# Patient Record
Sex: MALE | Race: ASIAN
[De-identification: names, ages, dates, MRNs, and addresses within clinical notes are randomized per-mention and may not be internally consistent; named-entity substitution may affect disease eponyms.]

---

## 2020-05-01 ENCOUNTER — HOSPITAL ENCOUNTER (EMERGENCY)
Dept: HOSPITAL 54 - ER | Age: 30
Discharge: HOME | End: 2020-05-01
Payer: MEDICAID

## 2020-05-01 VITALS — BODY MASS INDEX: 22.76 KG/M2 | WEIGHT: 145 LBS | HEIGHT: 67 IN

## 2020-05-01 VITALS — SYSTOLIC BLOOD PRESSURE: 139 MMHG | DIASTOLIC BLOOD PRESSURE: 89 MMHG

## 2020-05-01 DIAGNOSIS — F23: Primary | ICD-10-CM

## 2020-05-01 DIAGNOSIS — F31.9: ICD-10-CM

## 2020-05-01 LAB
ALBUMIN SERPL BCP-MCNC: 5 G/DL (ref 3.4–5)
ALP SERPL-CCNC: 99 U/L (ref 46–116)
ALT SERPL W P-5'-P-CCNC: 23 U/L (ref 12–78)
APAP SERPL-MCNC: < 2 UG/ML (ref 10–30)
APPEARANCE UR: (no result)
AST SERPL W P-5'-P-CCNC: 25 U/L (ref 15–37)
BASOPHILS # BLD AUTO: 0.1 /CMM (ref 0–0.2)
BASOPHILS NFR BLD AUTO: 0.4 % (ref 0–2)
BILIRUB DIRECT SERPL-MCNC: 0.2 MG/DL (ref 0–0.2)
BILIRUB SERPL-MCNC: 1.1 MG/DL (ref 0.2–1)
BILIRUB UR QL STRIP: (no result)
BUN SERPL-MCNC: 33 MG/DL (ref 7–18)
CALCIUM SERPL-MCNC: 10 MG/DL (ref 8.5–10.1)
CHLORIDE SERPL-SCNC: 100 MMOL/L (ref 98–107)
CO2 SERPL-SCNC: 22 MMOL/L (ref 21–32)
COLOR UR: (no result)
CREAT SERPL-MCNC: 1.5 MG/DL (ref 0.6–1.3)
EOSINOPHIL NFR BLD AUTO: 0.2 % (ref 0–6)
ETHANOL SERPL-MCNC: < 3 MG/DL (ref 0–0)
GLUCOSE SERPL-MCNC: 126 MG/DL (ref 74–106)
HCT VFR BLD AUTO: 44 % (ref 39–51)
HGB BLD-MCNC: 14.7 G/DL (ref 13.5–17.5)
KETONES UR STRIP-MCNC: 15 MG/DL
LYMPHOCYTES NFR BLD AUTO: 1.4 /CMM (ref 0.8–4.8)
LYMPHOCYTES NFR BLD AUTO: 10.1 % (ref 20–44)
MCHC RBC AUTO-ENTMCNC: 34 G/DL (ref 31–36)
MCV RBC AUTO: 91 FL (ref 80–96)
MONOCYTES NFR BLD AUTO: 1.3 /CMM (ref 0.1–1.3)
MONOCYTES NFR BLD AUTO: 9.6 % (ref 2–12)
NEUTROPHILS # BLD AUTO: 11.1 /CMM (ref 1.8–8.9)
NEUTROPHILS NFR BLD AUTO: 79.7 % (ref 43–81)
PH UR STRIP: 5.5 [PH] (ref 5–8)
PLATELET # BLD AUTO: 263 /CMM (ref 150–450)
POTASSIUM SERPL-SCNC: 4.1 MMOL/L (ref 3.5–5.1)
PROT SERPL-MCNC: 9.6 G/DL (ref 6.4–8.2)
PROT UR QL STRIP: >=300 MG/DL
RBC # BLD AUTO: 4.79 MIL/UL (ref 4.5–6)
SALICYLATES SERPL-MCNC: 3.9 MG/DL (ref 2.8–20)
SODIUM SERPL-SCNC: 137 MMOL/L (ref 136–145)
UROBILINOGEN UR STRIP-MCNC: 0.2 EU/DL
WBC #/AREA URNS HPF: (no result) /HPF (ref 0–3)
WBC NRBC COR # BLD AUTO: 13.9 K/UL (ref 4.3–11)

## 2020-05-01 PROCEDURE — 80048 BASIC METABOLIC PNL TOTAL CA: CPT

## 2020-05-01 PROCEDURE — 96372 THER/PROPH/DIAG INJ SC/IM: CPT

## 2020-05-01 PROCEDURE — G0480 DRUG TEST DEF 1-7 CLASSES: HCPCS

## 2020-05-01 PROCEDURE — 80076 HEPATIC FUNCTION PANEL: CPT

## 2020-05-01 PROCEDURE — 80329 ANALGESICS NON-OPIOID 1 OR 2: CPT

## 2020-05-01 PROCEDURE — 80305 DRUG TEST PRSMV DIR OPT OBS: CPT

## 2020-05-01 PROCEDURE — 85025 COMPLETE CBC W/AUTO DIFF WBC: CPT

## 2020-05-01 PROCEDURE — 81001 URINALYSIS AUTO W/SCOPE: CPT

## 2020-05-01 PROCEDURE — 99285 EMERGENCY DEPT VISIT HI MDM: CPT

## 2020-05-01 PROCEDURE — 36415 COLL VENOUS BLD VENIPUNCTURE: CPT

## 2020-05-01 PROCEDURE — 80307 DRUG TEST PRSMV CHEM ANLYZR: CPT

## 2020-05-01 NOTE — NUR
LCSW received a call from [facility] informing LCSW to evaluate the pt. Per MD notes and 
chart review, pt is a 29-year-old man who was walking around and behaving bizarrely multiple 
calls to the police department were made on arrival the Police Department found him to be 
confrontational and yelling at them and behaving bizarrely EMS was asked to present and the 
patient was brought to the hospital there is been no criminal charges however he is behaving 
bizarrely and has also in my asking him a history begun shouting at me and repeating I do 
not like you I do like you I do not like you your psychiatrist. LCSW met with the pt 
bedside. Pt is alert and oriented x 1. Pt is unable to participate in any meaningful 
conversation at this time. Pt appears disheveled. Pt is hallucination stating, " the girl is 
coming to kill me". Pt needs to be medicated at this time. LCSW consulted with MD who will 
medicate the pt.

## 2020-05-01 NOTE — NUR
PT AWAKE IN BED. VERBALIZED THAT HE FEELS BETTER. PT IS CALM AND COOPERATIVE. 
RELEASED L ARM RESTRAINT. +CMS. PT PROVIDED WITH MEAL.

## 2020-05-01 NOTE — NUR
PT REQUESTING TO BE DISCHARGED HOME. PT ALERT AND ORIENTED. PT DENIES SI/HI. PT 
STATES THAT HE WILL TAKE A LYFT HOME. DR CATES AWARE.

## 2020-05-01 NOTE — NUR
PT [id number]/THOR FROM STREETS ACTING BIZZARE, PT IS AAOX2, NOT IN RESPIRATORY 
DISTRESS, HOOKED TO MONITOR, KEPT RESTED AND COMFORTABLE, WILL CONTINUE TO 
MONITOR.

## 2020-05-01 NOTE — NUR
PT IS CLEARED FOR DISCHARGE BY DR. CATES. PT IS AAOX4. VERBALIZES THAT HE FEELS 
BETTER. AMBULATORY ON STEADY GAIT W/O ASSIST

## 2020-05-01 NOTE — NUR
PT WAS NOTED AGITATED AGAIN. SCEAMING YELLING AND PARANOID THAT SOMEONE WILL 
HURT HIM. MD MADE AWARE. ORDER RECEIVED TO GIVE ANOTHER DOSE OF ZYPREXA 10MG 
IM. NOTED AND CARRIED OUT.

## 2023-08-08 ENCOUNTER — HOSPITAL ENCOUNTER (INPATIENT)
Facility: HOSPITAL | Age: 33
LOS: 1 days | Discharge: PSYCHIATRIC OR SUBSTANCE USE FACILITY | DRG: 885 | End: 2023-08-09
Attending: PSYCHIATRY & NEUROLOGY | Admitting: PSYCHIATRY & NEUROLOGY
Payer: COMMERCIAL

## 2023-08-08 ENCOUNTER — HOSPITAL ENCOUNTER (EMERGENCY)
Facility: HOSPITAL | Age: 33
Discharge: PSYCHIATRIC OR SUBSTANCE USE FACILITY | End: 2023-08-08
Attending: EMERGENCY MEDICINE
Payer: COMMERCIAL

## 2023-08-08 VITALS
RESPIRATION RATE: 18 BRPM | HEIGHT: 66 IN | TEMPERATURE: 99 F | BODY MASS INDEX: 28.12 KG/M2 | WEIGHT: 175 LBS | HEART RATE: 77 BPM | DIASTOLIC BLOOD PRESSURE: 65 MMHG | OXYGEN SATURATION: 96 % | SYSTOLIC BLOOD PRESSURE: 136 MMHG

## 2023-08-08 DIAGNOSIS — F29 PSYCHOSIS: ICD-10-CM

## 2023-08-08 DIAGNOSIS — Z00.8 MEDICAL CLEARANCE FOR PSYCHIATRIC ADMISSION: Primary | ICD-10-CM

## 2023-08-08 DIAGNOSIS — F23 ACUTE PSYCHOSIS: ICD-10-CM

## 2023-08-08 LAB
ALBUMIN SERPL BCP-MCNC: 4.3 G/DL (ref 3.5–5.2)
ALP SERPL-CCNC: 55 U/L (ref 55–135)
ALT SERPL W/O P-5'-P-CCNC: 51 U/L (ref 10–44)
AMPHET+METHAMPHET UR QL: NEGATIVE
ANION GAP SERPL CALC-SCNC: 11 MMOL/L (ref 8–16)
AST SERPL-CCNC: 56 U/L (ref 10–40)
BACTERIA #/AREA URNS HPF: ABNORMAL /HPF
BARBITURATES UR QL SCN>200 NG/ML: NEGATIVE
BASOPHILS # BLD AUTO: 0.04 K/UL (ref 0–0.2)
BASOPHILS NFR BLD: 0.6 % (ref 0–1.9)
BENZODIAZ UR QL SCN>200 NG/ML: NEGATIVE
BILIRUB SERPL-MCNC: 1.3 MG/DL (ref 0.1–1)
BILIRUB UR QL STRIP: NEGATIVE
BUN SERPL-MCNC: 13 MG/DL (ref 6–20)
BZE UR QL SCN: NEGATIVE
CALCIUM SERPL-MCNC: 9.3 MG/DL (ref 8.7–10.5)
CANNABINOIDS UR QL SCN: NEGATIVE
CHLORIDE SERPL-SCNC: 102 MMOL/L (ref 95–110)
CLARITY UR: CLEAR
CO2 SERPL-SCNC: 24 MMOL/L (ref 23–29)
COLOR UR: YELLOW
CREAT SERPL-MCNC: 0.8 MG/DL (ref 0.5–1.4)
CREAT UR-MCNC: 191.9 MG/DL (ref 23–375)
DIFFERENTIAL METHOD: ABNORMAL
EOSINOPHIL # BLD AUTO: 0.1 K/UL (ref 0–0.5)
EOSINOPHIL NFR BLD: 1 % (ref 0–8)
ERYTHROCYTE [DISTWIDTH] IN BLOOD BY AUTOMATED COUNT: 11.3 % (ref 11.5–14.5)
EST. GFR  (NO RACE VARIABLE): >60 ML/MIN/1.73 M^2
ETHANOL SERPL-MCNC: <10 MG/DL
GLUCOSE SERPL-MCNC: 112 MG/DL (ref 70–110)
GLUCOSE UR QL STRIP: ABNORMAL
GRAN CASTS #/AREA URNS LPF: 1 /LPF
HCT VFR BLD AUTO: 37.4 % (ref 40–54)
HGB BLD-MCNC: 12.9 G/DL (ref 14–18)
HGB UR QL STRIP: ABNORMAL
HYALINE CASTS #/AREA URNS LPF: 131 /LPF
IMM GRANULOCYTES # BLD AUTO: 0.02 K/UL (ref 0–0.04)
IMM GRANULOCYTES NFR BLD AUTO: 0.3 % (ref 0–0.5)
KETONES UR QL STRIP: NEGATIVE
LEUKOCYTE ESTERASE UR QL STRIP: NEGATIVE
LYMPHOCYTES # BLD AUTO: 1.6 K/UL (ref 1–4.8)
LYMPHOCYTES NFR BLD: 22.4 % (ref 18–48)
MCH RBC QN AUTO: 30.8 PG (ref 27–31)
MCHC RBC AUTO-ENTMCNC: 34.5 G/DL (ref 32–36)
MCV RBC AUTO: 89 FL (ref 82–98)
METHADONE UR QL SCN>300 NG/ML: NEGATIVE
MICROSCOPIC COMMENT: ABNORMAL
MONOCYTES # BLD AUTO: 0.7 K/UL (ref 0.3–1)
MONOCYTES NFR BLD: 9.4 % (ref 4–15)
NEUTROPHILS # BLD AUTO: 4.7 K/UL (ref 1.8–7.7)
NEUTROPHILS NFR BLD: 66.3 % (ref 38–73)
NITRITE UR QL STRIP: NEGATIVE
NRBC BLD-RTO: 0 /100 WBC
OPIATES UR QL SCN: NEGATIVE
PCP UR QL SCN>25 NG/ML: NEGATIVE
PH UR STRIP: 6 [PH] (ref 5–8)
PLATELET # BLD AUTO: 209 K/UL (ref 150–450)
PMV BLD AUTO: 9.9 FL (ref 9.2–12.9)
POTASSIUM SERPL-SCNC: 3.1 MMOL/L (ref 3.5–5.1)
PROT SERPL-MCNC: 7.5 G/DL (ref 6–8.4)
PROT UR QL STRIP: ABNORMAL
RBC # BLD AUTO: 4.19 M/UL (ref 4.6–6.2)
RBC #/AREA URNS HPF: 11 /HPF (ref 0–4)
SARS-COV-2 RDRP RESP QL NAA+PROBE: NEGATIVE
SODIUM SERPL-SCNC: 137 MMOL/L (ref 136–145)
SP GR UR STRIP: 1.02 (ref 1–1.03)
TOXICOLOGY INFORMATION: NORMAL
TSH SERPL DL<=0.005 MIU/L-ACNC: 1.81 UIU/ML (ref 0.4–4)
UNIDENT CRYS URNS QL MICRO: ABNORMAL
URN SPEC COLLECT METH UR: ABNORMAL
UROBILINOGEN UR STRIP-ACNC: NEGATIVE EU/DL
WBC # BLD AUTO: 7.02 K/UL (ref 3.9–12.7)
WBC #/AREA URNS HPF: 3 /HPF (ref 0–5)
WBC CLUMPS URNS QL MICRO: ABNORMAL

## 2023-08-08 PROCEDURE — 11400000 HC PSYCH PRIVATE ROOM

## 2023-08-08 PROCEDURE — 99285 EMERGENCY DEPT VISIT HI MDM: CPT

## 2023-08-08 PROCEDURE — 85025 COMPLETE CBC W/AUTO DIFF WBC: CPT | Performed by: EMERGENCY MEDICINE

## 2023-08-08 PROCEDURE — 82077 ASSAY SPEC XCP UR&BREATH IA: CPT | Performed by: EMERGENCY MEDICINE

## 2023-08-08 PROCEDURE — G0427 PR INPT TELEHEALTH CON 70/>M: ICD-10-PCS | Mod: GT,,, | Performed by: STUDENT IN AN ORGANIZED HEALTH CARE EDUCATION/TRAINING PROGRAM

## 2023-08-08 PROCEDURE — 25000003 PHARM REV CODE 250: Performed by: EMERGENCY MEDICINE

## 2023-08-08 PROCEDURE — G0427 INPT/ED TELECONSULT70: HCPCS | Mod: GT,,, | Performed by: STUDENT IN AN ORGANIZED HEALTH CARE EDUCATION/TRAINING PROGRAM

## 2023-08-08 PROCEDURE — 80053 COMPREHEN METABOLIC PANEL: CPT | Performed by: EMERGENCY MEDICINE

## 2023-08-08 PROCEDURE — U0002 COVID-19 LAB TEST NON-CDC: HCPCS | Performed by: EMERGENCY MEDICINE

## 2023-08-08 PROCEDURE — 84443 ASSAY THYROID STIM HORMONE: CPT | Performed by: EMERGENCY MEDICINE

## 2023-08-08 PROCEDURE — 81000 URINALYSIS NONAUTO W/SCOPE: CPT | Performed by: EMERGENCY MEDICINE

## 2023-08-08 PROCEDURE — S4991 NICOTINE PATCH NONLEGEND: HCPCS | Performed by: EMERGENCY MEDICINE

## 2023-08-08 PROCEDURE — 12400001 HC PSYCH SEMI-PRIVATE ROOM

## 2023-08-08 PROCEDURE — 25000003 PHARM REV CODE 250

## 2023-08-08 PROCEDURE — 80307 DRUG TEST PRSMV CHEM ANLYZR: CPT | Performed by: EMERGENCY MEDICINE

## 2023-08-08 RX ORDER — IBUPROFEN 200 MG
1 TABLET ORAL DAILY
Status: DISCONTINUED | OUTPATIENT
Start: 2023-08-09 | End: 2023-08-09 | Stop reason: HOSPADM

## 2023-08-08 RX ORDER — DIPHENHYDRAMINE HCL 50 MG
50 CAPSULE ORAL EVERY 6 HOURS PRN
Status: DISCONTINUED | OUTPATIENT
Start: 2023-08-08 | End: 2023-08-09 | Stop reason: HOSPADM

## 2023-08-08 RX ORDER — TRAZODONE HYDROCHLORIDE 100 MG/1
100 TABLET ORAL NIGHTLY PRN
Status: DISCONTINUED | OUTPATIENT
Start: 2023-08-08 | End: 2023-08-09 | Stop reason: HOSPADM

## 2023-08-08 RX ORDER — ONDANSETRON 4 MG/1
4 TABLET, ORALLY DISINTEGRATING ORAL EVERY 6 HOURS PRN
Status: DISCONTINUED | OUTPATIENT
Start: 2023-08-08 | End: 2023-08-09 | Stop reason: HOSPADM

## 2023-08-08 RX ORDER — DIPHENHYDRAMINE HYDROCHLORIDE 50 MG/ML
50 INJECTION INTRAMUSCULAR; INTRAVENOUS EVERY 6 HOURS PRN
Status: DISCONTINUED | OUTPATIENT
Start: 2023-08-08 | End: 2023-08-09 | Stop reason: HOSPADM

## 2023-08-08 RX ORDER — ACETAMINOPHEN 325 MG/1
650 TABLET ORAL EVERY 6 HOURS PRN
Status: DISCONTINUED | OUTPATIENT
Start: 2023-08-08 | End: 2023-08-09 | Stop reason: HOSPADM

## 2023-08-08 RX ORDER — HALOPERIDOL 5 MG/ML
10 INJECTION INTRAMUSCULAR EVERY 6 HOURS PRN
Status: DISCONTINUED | OUTPATIENT
Start: 2023-08-08 | End: 2023-08-09 | Stop reason: HOSPADM

## 2023-08-08 RX ORDER — HYDROXYZINE HYDROCHLORIDE 50 MG/1
50 TABLET, FILM COATED ORAL EVERY 4 HOURS PRN
Status: DISCONTINUED | OUTPATIENT
Start: 2023-08-08 | End: 2023-08-09 | Stop reason: HOSPADM

## 2023-08-08 RX ORDER — MAG HYDROX/ALUMINUM HYD/SIMETH 200-200-20
30 SUSPENSION, ORAL (FINAL DOSE FORM) ORAL EVERY 6 HOURS PRN
Status: DISCONTINUED | OUTPATIENT
Start: 2023-08-08 | End: 2023-08-09 | Stop reason: HOSPADM

## 2023-08-08 RX ORDER — LORAZEPAM 1 MG/1
2 TABLET ORAL EVERY 6 HOURS PRN
Status: DISCONTINUED | OUTPATIENT
Start: 2023-08-08 | End: 2023-08-09 | Stop reason: HOSPADM

## 2023-08-08 RX ORDER — LORAZEPAM 2 MG/ML
2 INJECTION INTRAMUSCULAR EVERY 6 HOURS PRN
Status: DISCONTINUED | OUTPATIENT
Start: 2023-08-08 | End: 2023-08-09 | Stop reason: HOSPADM

## 2023-08-08 RX ORDER — HALOPERIDOL 5 MG/1
10 TABLET ORAL EVERY 6 HOURS PRN
Status: DISCONTINUED | OUTPATIENT
Start: 2023-08-08 | End: 2023-08-09 | Stop reason: HOSPADM

## 2023-08-08 RX ORDER — ADHESIVE BANDAGE
30 BANDAGE TOPICAL DAILY PRN
Status: DISCONTINUED | OUTPATIENT
Start: 2023-08-08 | End: 2023-08-09 | Stop reason: HOSPADM

## 2023-08-08 RX ORDER — IBUPROFEN 200 MG
1 TABLET ORAL
Status: DISCONTINUED | OUTPATIENT
Start: 2023-08-08 | End: 2023-08-08 | Stop reason: HOSPADM

## 2023-08-08 RX ORDER — PROMETHAZINE HYDROCHLORIDE 25 MG/1
25 TABLET ORAL EVERY 6 HOURS PRN
Status: DISCONTINUED | OUTPATIENT
Start: 2023-08-08 | End: 2023-08-09 | Stop reason: HOSPADM

## 2023-08-08 RX ADMIN — NICOTINE 1 PATCH: 21 PATCH, EXTENDED RELEASE TRANSDERMAL at 03:08

## 2023-08-08 RX ADMIN — TRAZODONE HYDROCHLORIDE 100 MG: 100 TABLET ORAL at 08:08

## 2023-08-08 RX ADMIN — HYDROXYZINE HYDROCHLORIDE 50 MG: 50 TABLET, FILM COATED ORAL at 09:08

## 2023-08-08 NOTE — CONSULTS
"[facility]  Psychiatry  Telepsychiatry Consult Note    Please see previous notes:    Patient agreeable to consultation via telepsychiatry.    Tele-Consultation from Psychiatry started: 8/8/2023 at 9:21AM  The chief complaint leading to psychiatric consultation is: "OPC.  Patient with chronic auditory hallucinations.  Family OPC'd due to the patient wanting to leave the home.  He would locked himself in the bathroom to smoke a cigarette and the family believes he is unwell.  Pec?"  This consultation was requested by Dr. Arden Strickland, the Emergency Department attending physician.  The location of the consulting psychiatrist is [place], [place].  The patient location is  [facility] EMERGENCY DEPARTMENT   The patient arrived at the ED at: 06:23    Also present with the patient at the time of the consultation: nurse/sitter    Patient Identification:   Hardy Barnett is a 32 y.o. male.    Patient information was obtained from patient, ER records, and collateral .  Patient presented involuntarily to the Emergency Department BIB police.    Inpatient consult to Telemedicine - Psyc  Consult performed by: Remedios Fitch MD  Consult ordered by: Arden Strickland Jr., MD  Reason for consult: OPC'd by family members, chronic auditory hallucinations  Assessment/Recommendations: Diagnosis/Impression:   Bizarre behaviors  History of bipolar disorder, type 1  R/o Substance-induced bipolar disorder given patient and collateral history of cannabis use disorder although UDS negative this admission  R/o Tobacco use disorder  Nonadherence to medical treatment    Recommendations:  -PEC given grave disability secondary to underlying psychiatric disorder. Patient exhibits hypomanic behavior during interview and per collateral has not been adherent with his medications. Parents attempted to call patient's outpatient provider, Dr. Medrano but says they were not successful in reaching his office.  -Transfer to inpatient behavioral " "health hospital once medically cleared for further clarification of diagnosis and re-initiation/titration of medications.  -Recommend calling patient's outpatient psychiatrist, number in collateral section of HPI for medication reconciliation. Per chart review of patient's hospitalizations in [place], he was previously on Invega Sustenna BUNCH and olanzapine PO.  -For nonredirectable agitation/aggression, ok to give PRN olanzapine 5mg SL/IM q8hrs.  -Recommend obtaining EKG to monitor QT-c interval give pt's history of antipsychotic use  -Father and mother would appreciate updates on patient's status. There is some language and cultural barrier to discussing mental health issues. Please do be mindful of this when communicating with patient's parents.        Consult Start Time: 08/08/2023 09:21 CDT  Consult End Time: 08/08/2023 10:32 CDT        Subjective:     History of Present Illness:  Hardy Barnett is a 33y/o man with a history of schizoaffective disorder vs bipolar disorder with multiple ED visits for psychiatric complaints per chart review.    Per ED MD note:  "8/8/2023, 6:29 AM   History obtained from the patient and OPC  History of Present Illness: Hardy Barnett is a 32 y.o. male patient who presents to the Emergency Department for psychiatric evaluation. Per OPC, "[facility] states patient is diagnosed with bipolar. Unsure of prescription medication names. Patient ran away from home 2 days ago. Patient has since returned home and went to his room and locked the door. Patient is talking to himself with a 'voice.' Patient has been to a psychiatric facility in [place]. Patient filed a report to the police that he's trapped by his parents. Patient is a client of the group with Dr. Morales." Pt states that his parents filed the OPC after catching him smoking in the bathroom. Symptoms are constant and moderate in severity. No mitigating or exacerbating factors reported. Pt reports chronic auditory " "hallucinations. Patient denies any SI, HI, fever, chills, n/v/d, SOB, CP, weakness, numbness, dizziness, headache, and all other sxs at this time. No further complaints or concerns at this time.  Arrival mode: Police/long term Transport"    On interview:  Patient is lying in bed and states that he is in the hospital "because somehow my mom caught me smoking cigarettes in the house and brought me to the hospital." When asked why he would be brought in for smoking cigarettes, he says "she doesn't like cigarettes and doesn't want me to be happy."    Hardy denies any medical or psychiatric diagnoses and says that he is not taking any medications. He denies any past psychiatric hospitalizations. He says that he is adopted and doesn't know his family history.    When confronted about the report this provider read in the charts - that he ran away from home, he finally says: "My parents hold me hostage and never let me leave the house and I'm a grown man." He says that he took an Uber to the [employer] and starts to dance in the hospital bed and says, "I was gambling it up." He says that he "did pretty well" but that he lost 30% of his savings. He then says that he is a "" after his [facility] at the [facility]. 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 but couldn't and moved back to [place].    He says that the longest period of time he's gone without sleeping was "72hrs." Asked if he was using substances during this time he says that this was recently when he was at the [employer] and he was "smoking cigarettes, chewing Nicorette gum, drinking cranberry juice, eating sandwiches, drinking smoothies."    Collateral: Mother and Father, Yony Barnett [phone]  Initially called patient's mother who directed this provider to call his father who is at the hospital. Interview is somewhat complicated due to language and cultural barrier.    Father says " "that:  "He's been under care before and diagnosed with bipolar disorder in 2012. At the time he was in [place] and had a lot of instances of marijuana smoking." "He has been through a lot of things." "Hardy was in [place] before we brought him back to [place] three years ago. He went back and forth to [place] a couple of times." Father perseverates on patient's recent stressors of applying to multiple jobs and that he was accepted at a few jobs and he "became very elated." His father noticed that he was starting to talk with voices once he got the job and very excited. Father says that Hardy has been working very hard on producing a film with a deadline for submission being 8/10. He says that Hardy is not adopted. He says that he is not aware of any family history of mental illness. Father says that pt's mother knows his medications better.    He says that Hardy has outpatient care with Dr. Medrano (psychiatrist) - [phone]    Psychiatric History:   Previous Psychiatric Hospitalizations: Yes - multiple in [place] where it appears that patient was attending school at [place]  Previous Medication Trials: Yes, per chart review, Invega Sustenna 234mg and olanzapine 10mg  Previous Suicide Attempts: Unclear  History of Violence: Did not assess  History of Depression: Unclear  History of Cris: Yes, 3 days without sleeping, elevated mood, increased risk taking behaviors from collateral and interview with patient although unclear if this was off any substances  History of Auditory/Visual Hallucination: Yes, per collateral  History of Delusions: Unclear however based on patient's report that he says he is adopted and father's report that he is not, could potentially be a delusion  Outpatient psychiatrist (current & past): Yes, per father patient sees Dr. Medrano, [phone]    Past Medical History:  No past medical history on file.    Outpatient Medications:  No current outpatient " "medications     Substance Abuse History:  Tobacco: Yes, says that he smokes "one cigarette every three hours"; previously vaped; says that he has smoked for twenty-two years then says that he has been abstinent for twenty years  Alcohol: "I barely drink alcohol"  Illicit Substances:  -Yes - endorses marijuana use "I smoke a little every day" then says "I stopped smoking weed three years ago"  -Yes to remote methamphetamine use  -Yes to smoking heroin but denies current use; denies any IV drug use ever says "injecting is stupid"  Detox/Rehab: "Why would I need to go to rehab."    Legal History: Past charges/incarcerations: did not assess     Family Psychiatric History: per patient he says he is adopted and doesn't know; per collateral (father) patient is not adopted and father does not know of any h/o mental illness      Social History:  Developmental/Childhood: grew up in  with bio mom, dad and sister  Education: college, some graduate studies per father  Employment Status/Finances: currently tutoring and making [employer]   Relationship Status/Sexual Orientation: did not assess  Children: did not assess  Housing Status: lives at home with bio mom and dad    Psychiatric Mental Status Exam:  Arousal: alert  Sensorium/Orientation: oriented to person, place, situation, time/date  Behavior/Cooperation: somewhat defensive but overall cooperates with interview; inappropriate for situation, goes from calm to extremely elevated   Speech: spontaneous, fluent, normal r/r/t then will suddenly become loud and talk faster but not pressured  Language: grossly intact  Mood: " My parents brought me here. "   Affect: inappropriate for situation, expansive  Thought Process: linear, logical, at times circumferential and needs redirection  Thought Content:   Auditory hallucinations: Denies  Visual hallucinations: Denies  Paranoia: Denies  Delusions:  questionable - he says he is adopted but father states he is not  Suicidal ideation: " denies  Homicidal ideation: denies  Attention/Concentration: needs redirection to attend to interview  Memory:    Recent: mostly intact although some question as to reliability   Remote: able to recall childhood events but some question as to reliability   Fund of Knowledge: Aware of current events   Abstract reasoning: proverbs were abstract  Insight: poor awareness of disease, denies that he has any mental health issues  Judgment: impaired due to recent events      Past Medical History: No past medical history on file.   Laboratory Data:   Labs Reviewed   CBC W/ AUTO DIFFERENTIAL - Abnormal; Notable for the following components:       Result Value    RBC 4.19 (*)     Hemoglobin 12.9 (*)     Hematocrit 37.4 (*)     RDW 11.3 (*)     All other components within normal limits   COMPREHENSIVE METABOLIC PANEL - Abnormal; Notable for the following components:    Potassium 3.1 (*)     Glucose 112 (*)     Total Bilirubin 1.3 (*)     AST 56 (*)     ALT 51 (*)     All other components within normal limits   URINALYSIS, REFLEX TO URINE CULTURE - Abnormal; Notable for the following components:    Protein, UA 1+ (*)     Glucose, UA 2+ (*)     Occult Blood UA 3+ (*)     All other components within normal limits    Narrative:     Specimen Source->Urine   URINALYSIS MICROSCOPIC - Abnormal; Notable for the following components:    RBC, UA 11 (*)     WBC Clumps, UA Occasional (*)     Hyaline Casts,  (*)     Granular Casts, UA 1 (*)     All other components within normal limits    Narrative:     Specimen Source->Urine   TSH   DRUG SCREEN PANEL, URINE EMERGENCY    Narrative:     Specimen Source->Urine   ALCOHOL,MEDICAL (ETHANOL)   SARS-COV-2 RNA AMPLIFICATION, QUAL       Neurological History:  Seizures: unable to assess  Head trauma: unable to assess    Allergies:   Review of patient's allergies indicates:  No Known Allergies    Medications in ER: Medications - No data to display    No new subjective & objective note has been  filed under this hospital service since the last note was generated.      Assessment - Diagnosis - Goals:     Diagnosis/Impression:   Bizarre behaviors  History of bipolar disorder, type 1  R/o Substance-induced bipolar disorder given patient and collateral history of cannabis use disorder although UDS negative this admission  R/o Tobacco use disorder  Nonadherence to medical treatment    Recommendations:  -PEC given grave disability secondary to underlying psychiatric disorder. Patient exhibits hypomanic behavior during interview and per collateral has not been adherent with his medications. Parents attempted to call patient's outpatient provider, Dr. Medrano but says they were not successful in reaching his office.  -Transfer to inpatient behavioral health hospital once medically cleared for further clarification of diagnosis and re-initiation/titration of medications.  -Recommend calling patient's outpatient psychiatrist, number in collateral section of HPI for medication reconciliation. Per chart review of patient's hospitalizations in [place], he was previously on Invega Sustenna BUNCH and olanzapine PO.  -For nonredirectable agitation/aggression, ok to give PRN olanzapine 5mg SL/IM q8hrs.  -Recommend obtaining EKG to monitor QT-c interval give pt's history of antipsychotic use  -Father and mother would appreciate updates on patient's status. There is some language and cultural barrier to discussing mental health issues. Please do be mindful of this when communicating with patient's parents.    Time with patient: 71mins      More than 50% of the time was spent counseling/coordinating care    Consulting clinician was informed of the encounter and consult note.    Consultation ended: 8/8/2023 at 10:32AM    Remedios Fitch MD   Psychiatry  [facility]

## 2023-08-08 NOTE — ED PROVIDER NOTES
"SCRIBE #1 NOTE: I, Bulmaro Lynu, am scribing for, and in the presence of, Arden Strickland Jr., MD. I have scribed the entire note.      History      Chief Complaint   Patient presents with    Psychiatric Evaluation     Pt reports he was smoking a cigarette in the bathroom at his parents bathroom. Mom found out pt was smoking. Upon arrival pt is under OPC order. Pt denies any SI/HI/Audible of visual hallucinations        Review of patient's allergies indicates:  No Known Allergies     HPI   HPI    8/8/2023, 6:29 AM   History obtained from the patient and OPC      History of Present Illness: Hardy Barnett is a 32 y.o. male patient who presents to the Emergency Department for psychiatric evaluation. Per OPC, "[facility] states patient is diagnosed with bipolar. Unsure of prescription medication names. Patient ran away from home 2 days ago. Patient has since returned home and went to his room and locked the door. Patient is talking to himself with a 'voice.' Patient has been to a psychiatric facility in [place]. Patient filed a report to the police that he's trapped by his parents. Patient is a client of the group with Dr. Morales." Pt states that his parents filed the OPC after catching him smoking in the bathroom. Symptoms are constant and moderate in severity. No mitigating or exacerbating factors reported. Pt reports chronic auditory hallucinations. Patient denies any SI, HI, fever, chills, n/v/d, SOB, CP, weakness, numbness, dizziness, headache, and all other sxs at this time. No further complaints or concerns at this time.     Arrival mode: Police/jail Transport    PCP: No, Primary Doctor       Past Medical History:  No past medical history on file.    Past Surgical History:  No past surgical history on file.      Family History:  No family history on file.    Social History:  Social History     Tobacco Use    Smoking status: Not on file    Smokeless tobacco: Not on file   Substance and Sexual Activity    " Alcohol use: Not on file    Drug use: Not on file    Sexual activity: Not on file       ROS   Review of Systems   Unable to perform ROS: Psychiatric disorder   Constitutional:  Negative for chills and fever.   HENT:  Negative for sore throat.    Respiratory:  Negative for shortness of breath.    Cardiovascular:  Negative for chest pain.   Gastrointestinal:  Negative for diarrhea, nausea and vomiting.   Genitourinary:  Negative for dysuria.   Musculoskeletal:  Negative for back pain.   Skin:  Negative for rash.   Neurological:  Negative for dizziness, weakness, numbness and headaches.   Hematological:  Does not bruise/bleed easily.   Psychiatric/Behavioral:  Positive for behavioral problems and hallucinations (auditory, chronic). Negative for suicidal ideas.         (-) HI   All other systems reviewed and are negative.    Physical Exam      Initial Vitals [08/08/23 0634]   BP Pulse Resp Temp SpO2   131/86 105 18 98.5 °F (36.9 °C) 96 %      MAP       --          Physical Exam  Nursing Notes and Vital Signs Reviewed.  Constitutional: Patient is in no acute distress. Well-developed and well-nourished.  Head: Atraumatic. Normocephalic.  Eyes:  EOM intact.  No scleral icterus.  ENT: Mucous membranes are moist.  Nares clear   Neck:  Full ROM. No JVD.  Cardiovascular: Regular rate. Regular rhythm No murmurs, rubs, or gallops. Distal pulses are 2+ and symmetric  Pulmonary/Chest: No respiratory distress. Clear to auscultation bilaterally. No wheezing or rales.  Equal chest wall rise bilaterally  Abdominal: Soft and non-distended.  There is no tenderness.  No rebound, guarding, or rigidity. Good bowel sounds.  Genitourinary: No CVA tenderness.  No suprapubic tenderness  Musculoskeletal: Moves all extremities. No obvious deformities.  5 x 5 strength in all extremities   Skin: Warm and dry.  Neurological:  Alert, awake, and appropriate.  Normal speech.  No acute focal neurological deficits are appreciated.  Two through 12 intact  "bilaterally.  Psychiatric:  Patient is calm however he is very paranoid.  He continuously threatened this lawsuits.  Patient has some delusions.  Denies all allegations in the OPC however family confirms that the patient has auditory hallucinations in his talking to himself.  Patient states that he always has auditory hallucinations however he is not suicidal homicidal.  Per the family the patient is gravely disabled and unable to function due to his mental illness.  Pec was issued pending psychiatry eval    ED Course    Procedures  ED Vital Signs:  Vitals:    08/08/23 0634   BP: 131/86   Pulse: 105   Resp: 18   Temp: 98.5 °F (36.9 °C)   TempSrc: Oral   SpO2: 96%   Weight: 79.4 kg (175 lb)   Height: 5' 6" (1.676 m)       Abnormal Lab Results:  Labs Reviewed   CBC W/ AUTO DIFFERENTIAL - Abnormal; Notable for the following components:       Result Value    RBC 4.19 (*)     Hemoglobin 12.9 (*)     Hematocrit 37.4 (*)     RDW 11.3 (*)     All other components within normal limits   COMPREHENSIVE METABOLIC PANEL - Abnormal; Notable for the following components:    Potassium 3.1 (*)     Glucose 112 (*)     Total Bilirubin 1.3 (*)     AST 56 (*)     ALT 51 (*)     All other components within normal limits   URINALYSIS, REFLEX TO URINE CULTURE - Abnormal; Notable for the following components:    Protein, UA 1+ (*)     Glucose, UA 2+ (*)     Occult Blood UA 3+ (*)     All other components within normal limits    Narrative:     Specimen Source->Urine   URINALYSIS MICROSCOPIC - Abnormal; Notable for the following components:    RBC, UA 11 (*)     WBC Clumps, UA Occasional (*)     Hyaline Casts,  (*)     Granular Casts, UA 1 (*)     All other components within normal limits    Narrative:     Specimen Source->Urine   TSH   DRUG SCREEN PANEL, URINE EMERGENCY    Narrative:     Specimen Source->Urine   ALCOHOL,MEDICAL (ETHANOL)   SARS-COV-2 RNA AMPLIFICATION, QUAL        All Lab Results:  Results for orders placed or " performed during the hospital encounter of 08/08/23   CBC auto differential   Result Value Ref Range    WBC 7.02 3.90 - 12.70 K/uL    RBC 4.19 (L) 4.60 - 6.20 M/uL    Hemoglobin 12.9 (L) 14.0 - 18.0 g/dL    Hematocrit 37.4 (L) 40.0 - 54.0 %    MCV 89 82 - 98 fL    MCH 30.8 27.0 - 31.0 pg    MCHC 34.5 32.0 - 36.0 g/dL    RDW 11.3 (L) 11.5 - 14.5 %    Platelets 209 150 - 450 K/uL    MPV 9.9 9.2 - 12.9 fL    Immature Granulocytes 0.3 0.0 - 0.5 %    Gran # (ANC) 4.7 1.8 - 7.7 K/uL    Immature Grans (Abs) 0.02 0.00 - 0.04 K/uL    Lymph # 1.6 1.0 - 4.8 K/uL    Mono # 0.7 0.3 - 1.0 K/uL    Eos # 0.1 0.0 - 0.5 K/uL    Baso # 0.04 0.00 - 0.20 K/uL    nRBC 0 0 /100 WBC    Gran % 66.3 38.0 - 73.0 %    Lymph % 22.4 18.0 - 48.0 %    Mono % 9.4 4.0 - 15.0 %    Eosinophil % 1.0 0.0 - 8.0 %    Basophil % 0.6 0.0 - 1.9 %    Differential Method Automated    Comprehensive metabolic panel   Result Value Ref Range    Sodium 137 136 - 145 mmol/L    Potassium 3.1 (L) 3.5 - 5.1 mmol/L    Chloride 102 95 - 110 mmol/L    CO2 24 23 - 29 mmol/L    Glucose 112 (H) 70 - 110 mg/dL    BUN 13 6 - 20 mg/dL    Creatinine 0.8 0.5 - 1.4 mg/dL    Calcium 9.3 8.7 - 10.5 mg/dL    Total Protein 7.5 6.0 - 8.4 g/dL    Albumin 4.3 3.5 - 5.2 g/dL    Total Bilirubin 1.3 (H) 0.1 - 1.0 mg/dL    Alkaline Phosphatase 55 55 - 135 U/L    AST 56 (H) 10 - 40 U/L    ALT 51 (H) 10 - 44 U/L    eGFR >60 >60 mL/min/1.73 m^2    Anion Gap 11 8 - 16 mmol/L   TSH   Result Value Ref Range    TSH 1.811 0.400 - 4.000 uIU/mL   Urinalysis, Reflex to Urine Culture Urine, Clean Catch    Specimen: Urine   Result Value Ref Range    Specimen UA Urine, Clean Catch     Color, UA Yellow Yellow, Straw, Deb    Appearance, UA Clear Clear    pH, UA 6.0 5.0 - 8.0    Specific Gravity, UA 1.025 1.005 - 1.030    Protein, UA 1+ (A) Negative    Glucose, UA 2+ (A) Negative    Ketones, UA Negative Negative    Bilirubin (UA) Negative Negative    Occult Blood UA 3+ (A) Negative    Nitrite, UA Negative  Negative    Urobilinogen, UA Negative <2.0 EU/dL    Leukocytes, UA Negative Negative   Drug screen panel, emergency   Result Value Ref Range    Benzodiazepines Negative Negative    Methadone metabolites Negative Negative    Cocaine (Metab.) Negative Negative    Opiate Scrn, Ur Negative Negative    Barbiturate Screen, Ur Negative Negative    Amphetamine Screen, Ur Negative Negative    THC Negative Negative    Phencyclidine Negative Negative    Creatinine, Urine 191.9 23.0 - 375.0 mg/dL    Toxicology Information SEE COMMENT    Ethanol   Result Value Ref Range    Alcohol, Serum <10 <10 mg/dL   COVID-19 Rapid Screening   Result Value Ref Range    SARS-CoV-2 RNA, Amplification, Qual Negative Negative   Urinalysis Microscopic   Result Value Ref Range    RBC, UA 11 (H) 0 - 4 /hpf    WBC, UA 3 0 - 5 /hpf    WBC Clumps, UA Occasional (A) None-Rare    Bacteria Rare None-Occ /hpf    Hyaline Casts,  (A) 0-1/lpf /lpf    Granular Casts, UA 1 (A) None /lpf    Unclass Radha UA Rare None-Moderate    Microscopic Comment SEE COMMENT      Imaging Results:  Imaging Results    None                 The Emergency Provider reviewed the vital signs and test results, which are outlined above.    ED Discussion     6:54 AM: The PEC hold has been issued by Dr. Strickland at this time for grave disability.    10:36 AM: Discussed pt's case with Dr. Jiang (Psychiatry via Tele Psych consult). Dr. Jiang has evaluated pt at bedside and recommends continuing the PEC, as well as transfer to an inpatient psychiatric facility.    10:48 AM: Pt has been medically cleared by Dr. Strickland at this time. Reassessed pt at this time. Pt is resting comfortably and appears in no acute distress. There are no psychiatric services offered at this facility. D/w pt all pertinent ED information and plan to transfer to psychiatric facility for psychiatric treatment. Pt verbalizes understanding. Patient being transferred by [facility] for ongoing personal protection en route. Pt  has been made aware of all risks and benefits associated with transfer, including but not limited to death, MVC, loss of vital signs, and/or permanent disability. Benefits include ability to be treated at an inpatient psychiatric facility. Pt will be transported by personnel trained in CPR and CPI. Patient understands that there could be unforeseen motor vehicle accidents, inclement weather, or loss of vital signs that could result in potential death or permanent disability. All questions and complaints have been addressed at this time. Pt condition is stable at this time and is clear to transfer to psychiatric facility at this time.            ED Medication(s):  Medications - No data to display      New Prescriptions    No medications on file        Medical Decision Making    Medical Decision Making:   History:   Old Medical Records: I decided to obtain old medical records.  Old Records Summarized: records from previous admission(s).       <> Summary of Records: Patient with history of bipolar disorder in his history along with admissions  Initial Assessment:   Patient on OPC secondary to auditory hallucinations, running away from home, locking himself in the bathroom.  Patient is having auditory hallucinations.  Per the family the patient is nonfunctional.  The patient states that he simply tried to leave the home at the age of 32 and the family would not allow it.  He notes that he locked himself in the bathroom to smoke a cigarette which is family this improves off.  Differential Diagnosis:   Psychosis, schizophrenia, medical clearance for psychiatric placement polysubstance abuse, auditory hallucinations, normal exam  Clinical Tests:   Lab Tests: Ordered and Reviewed  Radiological Study: Ordered and Reviewed  ED Management:  Patient was evaluated history physical examination.  Ordered interpreted all lab studies.  Patient had mild hypokalemia with a potassium of 3.1.  His CMP was otherwise benign.  TSH was  normal COVID was negative CBC showed a normal white count no shift.  Have a mild bump and some of his LFTs with AST of 56 and ALT at 51 with a bili of 1.3 but has no abdominal tenderness right upper quadrant.  UA is negative UDS is negative.  Pec was issued the patient was subsequently evaluated by tele psychiatry.  After extensive evaluation on their part they do recommend continuation of the pec and admission for psychiatric workup.  I discussed this with the patient who verbalized understanding and agreement with the plan of care and seems reliable.           Scribe Attestation:   Scribe #1: I performed the above scribed service and the documentation accurately describes the services I performed. I attest to the accuracy of the note.    Attending:   Physician Attestation Statement for Scribe #1: I, Arden Strickland Jr., MD, personally performed the services described in this documentation, as scribed by Bulmaro Roa, in my presence, and it is both accurate and complete.          Clinical Impression       ICD-10-CM ICD-9-CM   1. Medical clearance for psychiatric admission  Z00.8 V70.8   2. Acute psychosis  F23 298.9       Disposition:   Disposition: Transferred  Condition: Stable         Arden Strickland Jr., MD  08/08/23 1201

## 2023-08-08 NOTE — ED NOTES
Pt. Resting in bed. No acute distress, RR equal and non labored, VSS. Bed in low and locked position. Side rails up X 2.  Patient denies any needs at this time. Will continue to monitor. Pt's room secured per protocol. Pt's belongings secured and pt placed in blue gown and yellow socks. Pt being directly monitored by nupur Lipscomb at this time. Will continue to monitor.     Pt belongings locked and labeled in BIN 28:    Wallet with $450, ID and cards  Cell phone  Headphones  Air pods  Sneakers  Socks  Oh pants    White shirt  Gray back pack  Computer  Tissues    Charging pad  wipes

## 2023-08-08 NOTE — ED NOTES
Pt. Resting in bed. No acute distress, RR equal and non labored, VSS. Bed in low and locked position. Patient denies any needs at this time. Will continue to monitor. Pt's room secured per protocol. Pt's belongings secured and pt placed in blue gown and yellow socks. Pt being directly monitored by nupur Lipscomb at this time. Will continue to monitor.

## 2023-08-09 VITALS
WEIGHT: 168.38 LBS | BODY MASS INDEX: 27.06 KG/M2 | OXYGEN SATURATION: 98 % | TEMPERATURE: 99 F | SYSTOLIC BLOOD PRESSURE: 123 MMHG | HEART RATE: 79 BPM | HEIGHT: 66 IN | RESPIRATION RATE: 17 BRPM | DIASTOLIC BLOOD PRESSURE: 74 MMHG

## 2023-08-09 PROBLEM — F25.9 SCHIZOAFFECTIVE DISORDER: Status: ACTIVE | Noted: 2023-08-09

## 2023-08-09 LAB
ALBUMIN SERPL-MCNC: 4.1 G/DL (ref 3.5–5)
ALBUMIN/GLOB SERPL: 1.5 RATIO (ref 1.1–2)
ALP SERPL-CCNC: 52 UNIT/L (ref 40–150)
ALT SERPL-CCNC: 57 UNIT/L (ref 0–55)
AST SERPL-CCNC: 46 UNIT/L (ref 5–34)
BASOPHILS # BLD AUTO: 0.05 X10(3)/MCL
BASOPHILS NFR BLD AUTO: 0.9 %
BILIRUB SERPL-MCNC: 0.5 MG/DL
BUN SERPL-MCNC: 13.6 MG/DL (ref 8.9–20.6)
CALCIUM SERPL-MCNC: 9.7 MG/DL (ref 8.4–10.2)
CHLORIDE SERPL-SCNC: 104 MMOL/L (ref 98–107)
CHOLEST SERPL-MCNC: 139 MG/DL
CHOLEST/HDLC SERPL: 4 {RATIO} (ref 0–5)
CO2 SERPL-SCNC: 28 MMOL/L (ref 22–29)
CREAT SERPL-MCNC: 0.77 MG/DL (ref 0.73–1.18)
EOSINOPHIL # BLD AUTO: 0.13 X10(3)/MCL (ref 0–0.9)
EOSINOPHIL NFR BLD AUTO: 2.2 %
ERYTHROCYTE [DISTWIDTH] IN BLOOD BY AUTOMATED COUNT: 11.5 % (ref 11.5–17)
GFR SERPLBLD CREATININE-BSD FMLA CKD-EPI: >60 MLS/MIN/1.73/M2
GLOBULIN SER-MCNC: 2.8 GM/DL (ref 2.4–3.5)
GLUCOSE SERPL-MCNC: 89 MG/DL (ref 74–100)
HCT VFR BLD AUTO: 38.9 % (ref 42–52)
HDLC SERPL-MCNC: 35 MG/DL (ref 35–60)
HGB BLD-MCNC: 13.1 G/DL (ref 14–18)
IMM GRANULOCYTES # BLD AUTO: 0.01 X10(3)/MCL (ref 0–0.04)
IMM GRANULOCYTES NFR BLD AUTO: 0.2 %
LDLC SERPL CALC-MCNC: 77 MG/DL (ref 50–140)
LYMPHOCYTES # BLD AUTO: 2.72 X10(3)/MCL (ref 0.6–4.6)
LYMPHOCYTES NFR BLD AUTO: 46.5 %
MCH RBC QN AUTO: 31.2 PG (ref 27–31)
MCHC RBC AUTO-ENTMCNC: 33.7 G/DL (ref 33–36)
MCV RBC AUTO: 92.6 FL (ref 80–94)
MONOCYTES # BLD AUTO: 0.6 X10(3)/MCL (ref 0.1–1.3)
MONOCYTES NFR BLD AUTO: 10.3 %
NEUTROPHILS # BLD AUTO: 2.34 X10(3)/MCL (ref 2.1–9.2)
NEUTROPHILS NFR BLD AUTO: 39.9 %
NRBC BLD AUTO-RTO: 0 %
PLATELET # BLD AUTO: 233 X10(3)/MCL (ref 130–400)
PMV BLD AUTO: 11 FL (ref 7.4–10.4)
POTASSIUM SERPL-SCNC: 4.2 MMOL/L (ref 3.5–5.1)
PROT SERPL-MCNC: 6.9 GM/DL (ref 6.4–8.3)
RBC # BLD AUTO: 4.2 X10(6)/MCL (ref 4.7–6.1)
SODIUM SERPL-SCNC: 143 MMOL/L (ref 136–145)
T PALLIDUM AB SER QL: NONREACTIVE
TRIGL SERPL-MCNC: 136 MG/DL (ref 34–140)
TSH SERPL-ACNC: 2.95 UIU/ML (ref 0.35–4.94)
VLDLC SERPL CALC-MCNC: 27 MG/DL
WBC # SPEC AUTO: 5.85 X10(3)/MCL (ref 4.5–11.5)

## 2023-08-09 PROCEDURE — 86780 TREPONEMA PALLIDUM: CPT

## 2023-08-09 PROCEDURE — 80053 COMPREHEN METABOLIC PANEL: CPT

## 2023-08-09 PROCEDURE — 85025 COMPLETE CBC W/AUTO DIFF WBC: CPT

## 2023-08-09 PROCEDURE — 84443 ASSAY THYROID STIM HORMONE: CPT

## 2023-08-09 PROCEDURE — 80061 LIPID PANEL: CPT

## 2023-08-09 RX ORDER — HALOPERIDOL 5 MG/1
5 TABLET ORAL NIGHTLY
Qty: 30 TABLET | Refills: 0
Start: 2023-08-09 | End: 2024-08-08

## 2023-08-09 RX ORDER — HALOPERIDOL 5 MG/1
5 TABLET ORAL NIGHTLY
Status: DISCONTINUED | OUTPATIENT
Start: 2023-08-09 | End: 2023-08-09 | Stop reason: HOSPADM

## 2023-08-09 NOTE — HPI
My mom caught me with a cigarette and had me hospitalized. I have bipolar disorder. Trouble sleeping.  I have a voice that speaks to me that is incurable.  She speaks to me (The voice) only if I speak to her.

## 2023-08-09 NOTE — PLAN OF CARE
Problem: Adult Inpatient Plan of Care  Goal: Plan of Care Review  Outcome: Ongoing, Not Progressing  Goal: Patient-Specific Goal (Individualized)  Outcome: Ongoing, Not Progressing  Goal: Absence of Hospital-Acquired Illness or Injury  Outcome: Ongoing, Not Progressing  Goal: Optimal Comfort and Wellbeing  Outcome: Ongoing, Not Progressing  Goal: Readiness for Transition of Care  Outcome: Ongoing, Not Progressing     Problem: Behavior Regulation Impairment (Psychotic Signs/Symptoms)  Goal: Improved Behavioral Control (Psychotic Signs/Symptoms)  Outcome: Ongoing, Not Progressing     Problem: Cognitive Impairment (Psychotic Signs/Symptoms)  Goal: Optimal Cognitive Function (Psychotic Signs/Symptoms)  Outcome: Ongoing, Not Progressing     Problem: Cognitive Impairment (Psychotic Signs/Symptoms)  Goal: Optimal Cognitive Function (Psychotic Signs/Symptoms)  Outcome: Ongoing, Not Progressing     Problem: Decreased Participation and Engagement (Psychotic Signs/Symptoms)  Goal: Increased Participation and Engagement (Psychotic Signs/Symptoms)  Outcome: Ongoing, Not Progressing     Problem: Mood Impairment (Psychotic Signs/Symptoms)  Goal: Improved Mood Symptoms (Psychotic Signs/Symptoms)  Outcome: Ongoing, Not Progressing     Problem: Psychomotor Impairment (Psychotic Signs/Symptoms)  Goal: Improved Psychomotor Symptoms (Psychotic Signs/Symptoms)  Outcome: Ongoing, Not Progressing     Problem: Sensory Perception Impairment (Psychotic Signs/Symptoms)  Goal: Decreased Sensory Symptoms (Psychotic Signs/Symptoms)  Outcome: Ongoing, Not Progressing     Problem: Sleep Disturbance (Psychotic Signs/Symptoms)  Goal: Improved Sleep (Psychotic Signs/Symptoms)  Outcome: Ongoing, Not Progressing     Problem: Social, Occupational or Functional Impairment (Psychotic Signs/Symptoms)  Goal: Enhanced Social, Occupational or Functional Skills (Psychotic Signs/Symptoms)  Outcome: Ongoing, Not Progressing

## 2023-08-09 NOTE — GROUP NOTE
Group Psychotherapy       Group Focus: Life Skills      Number of patients in attendance: 8    Group Start Time: 1100  Group End Time:  1145  Groups Date: 8/9/2023  Group Topic:  Behavioral Health  Group Department: [facility]  Group Facilitators:  Deb Stapleton SSW   _____________________________________________________________________    Patient Name: Hardy Barnett  MRN: [id number]  Patient Class: IP- Psych   Admission Date\Time: 8/8/2023  7:36 PM  Hospital Length of Stay: 1  Primary Care Provider: Nadine, Primary Doctor     Referred by: Acute Psychiatry Unit Treatment Team     Target symptoms: Poor Coping Skills     Patient's response to treatment: pt entered group toward the end of session, minimally participated     Progress toward goals: Not progressing     Interval History:      Diagnosis:      Plan: Pt transferred to other facility today

## 2023-08-09 NOTE — NURSING
"Admission Note:    Hardy Barnett is a 32 y.o. male, : 1990, MRN: [id number], admitted on 2023 to [facility] ([facility]) for Lázaro Jameson MD with a diagnosis of Psychosis [F29]. Patient admitted on a status of Physician Emergency Certificate (PEC). Hardy reports no known food or drug allergies.    Patient demonstrated an affect that was flat and anxious. Patient demonstrated mood during assessment that was depressed and anxious. Patient had an appearance that was disheveled.  Patient denies suicidal ideation. Patient denies suicide plan. Patient endorses hallucinations.    Homars  height is 5' 6" (1.676 m) and weight is 76.4 kg (168 lb 6.4 oz). His oral temperature is 96.3 °F (35.7 °C). His blood pressure is 135/84 and his pulse is 95. His respiration is 18.     Metal detector screening performed via security personnel. The result of the scan was negative.  Head-to-toe physical assessment completed with the following findings:  No contraband_found upon body screen. A full skin assessment was performed. Homars skin appeared _wnl/intact______.  Hardy was oriented to unit, staff, peers, and room. Patient belongings/valuables stored in locked intake room cabinet and changes of clothing provided to patient. Hardy was placed on Q 15 min observations.      "

## 2023-08-09 NOTE — NURSING
"Daily Nursing Note:      Behavior:    Patient (Hardy Barnett is a 32 y.o. male, : 1990, MRN: [id number]) demonstrating an affect that was flat. Hardy demonstrating mood that is anxious. Hardy had an appearance that was disheveled. Hardy denies suicidal ideation. Hardy denies suicide plan. Hardy denies homicidal ideation. Hardy endorses auditory hallucinations.    Hardy's  height is 5' 6" (1.676 m) and weight is 76.4 kg (168 lb 6.4 oz). His temperature is 99 °F (37.2 °C). His blood pressure is 123/74 and his pulse is 79. His respiration is 17 and oxygen saturation is 98%.     Homars last BM was noted on: _2023  ______      Intervention:    Encourage Hardy to perform self-hygiene, grooming, and changing of clothing. Monitor Hardy's behavior and program compliance. Monitor Hardy for suicidal ideation, homicidal ideation, sleep disturbance, and hallucinations. Encourage Hardy to eat all portions of meals and assess for meal preferences. Monitor Hardy for intake and output to ensure hydration. Notify the Physician/Physician Assistant/Advance Practice Registered Nurse (MD/PA/APRN) for any medication refusal and any change in patient condition.      Response:    Hardy verbalizes understand of unit process and procedures. Hardy reported medications _ New medications to start today._____.      Plan:     Continue to monitor per MD/PA/APRN orders; maintain patient safety.    "

## 2023-08-09 NOTE — NURSING
"PRN Medication Follow-up Note:    Behavior:    Patient (Hardy Barnett is a 32 y.o. male, : 1990, MRN: [id number])     Allergies: Patient has no known allergies.    Homars  height is 5' 6" (1.676 m) and weight is 76.4 kg (168 lb 6.4 oz). His oral temperature is 96.3 °F (35.7 °C). His blood pressure is 135/84 and his pulse is 95. His respiration is 18.     Administered trazodone 100mg po  per physician order to Hardy       Intervention:    Intervention to Hardy's response: pt tolerated well.       Response:    Hardy's response: effective      Plan:     Continue to monitor per MD/PA/APRN orders; and reevaluate medication effectiveness within 30 minutes.   "
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
## 2023-08-09 NOTE — NURSING
"PRN Medication Follow-up Note:    Behavior:    Patient (Hardy Barnett is a 32 y.o. male, : 1990, MRN: [id number])     Allergies: Patient has no known allergies.    Homars  height is 5' 6" (1.676 m) and weight is 76.4 kg (168 lb 6.4 oz). His oral temperature is 96.3 °F (35.7 °C). His blood pressure is 135/84 and his pulse is 95. His respiration is 18.     Administered hydroxyzine 50mg po per physician order to Hardy       Intervention:    Intervention to Hardy's response: pt tolerated well__       Response:    Hardy's response: effective      Plan:     Continue to monitor per MD/PA/APRN orders; and reevaluate medication effectiveness within 30 minutes.   "

## 2023-08-09 NOTE — NURSING
"PRN Administration Note:    Behavior:    Patient (Hardy Barnett is a 32 y.o. male, : 1990, MRN: [id number])     Allergies: Patient has no known allergies.    Hardy's  height is 5' 6" (1.676 m) and weight is 76.4 kg (168 lb 6.4 oz). His oral temperature is 96.3 °F (35.7 °C). His blood pressure is 135/84 and his pulse is 95. His respiration is 18.     Reason for PRN Administration: anxiety    Intervention:    Administered hydroxyzine 50mg po_per physician order to Hardy       Response:    Hardy tolerated administration well.      Plan:     Continue to monitor per MD/PA/APRN orders; and reevaluate medication effectiveness within 30 minutes.   "

## 2023-08-09 NOTE — NURSING
"PRN Administration Note:    Behavior:    Patient (Hardy Barnett is a 32 y.o. male, : 1990, MRN: [id number])     Allergies: Patient has no known allergies.    Hardy's  height is 5' 6" (1.676 m) and weight is 76.4 kg (168 lb 6.4 oz). His oral temperature is 96.3 °F (35.7 °C). His blood pressure is 135/84 and his pulse is 95. His respiration is 18.     Reason for PRN Administration: insomnia .    Intervention:    Administered trazodone 100mg po per physician order to Hardy       Response:    Hardy tolerated administration well.      Plan:     Continue to monitor per MD/PA/APRN orders; and reevaluate medication effectiveness within 30 minutes.   "

## 2023-08-09 NOTE — NURSING
Discharge Note:    Hardy Barnett is a 32 y.o. male, : 1990, MRN: [id number], admitted on 2023 for Lázaro Jameson MD with a diagnosis of Psychosis [F29].    Patient discharged on 2023 per physician orders in stable condition. Patient denied suicidal ideation, homicidal ideation, or hallucinations. Patient was discharged with valuables, personal belongings, prescriptions, discharge instructions, and an educational handout explaining the diagnosis and prescribed medications. Patient verbalized understanding of the discharge instructions and importance of follow-up visits. Patient was escorted out of the facility by [facility] and placed into a secure transport vehicle to be transported to rehab.     Patient discharged on the following medications:     Medication List        START taking these medications      haloperidoL 5 MG tablet  Commonly known as: HALDOL  Take 1 tablet (5 mg total) by mouth every evening.               Where to Get Your Medications        Information about where to get these medications is not yet available    Ask your nurse or doctor about these medications  haloperidoL 5 MG tablet
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
## 2023-08-09 NOTE — PLAN OF CARE
Problem: Adult Inpatient Plan of Care  Goal: Plan of Care Review  8/9/2023 0046 by Madelyn Lowry RN  Outcome: Ongoing, Not Progressing  8/9/2023 0043 by Madelyn Lowry RN  Outcome: Ongoing, Not Progressing  Goal: Patient-Specific Goal (Individualized)  8/9/2023 0046 by Madelyn Lowry RN  Outcome: Ongoing, Not Progressing  8/9/2023 0043 by Madelyn Lowry RN  Outcome: Ongoing, Not Progressing  Goal: Absence of Hospital-Acquired Illness or Injury  8/9/2023 0046 by Madelyn Lowry RN  Outcome: Ongoing, Not Progressing  8/9/2023 0043 by Madelyn Lowry RN  Outcome: Ongoing, Not Progressing  Goal: Optimal Comfort and Wellbeing  8/9/2023 0046 by Madelyn Lowry RN  Outcome: Ongoing, Not Progressing  8/9/2023 0043 by Madelyn Lowry RN  Outcome: Ongoing, Not Progressing  Goal: Readiness for Transition of Care  8/9/2023 0046 by Madelyn Lowry RN  Outcome: Ongoing, Not Progressing  8/9/2023 0043 by Madelyn Lowry RN  Outcome: Ongoing, Not Progressing     Problem: Behavior Regulation Impairment (Psychotic Signs/Symptoms)  Goal: Improved Behavioral Control (Psychotic Signs/Symptoms)  8/9/2023 0046 by Madelny Lowry RN  Outcome: Ongoing, Not Progressing  8/9/2023 0043 by Madelyn Lowry RN  Outcome: Ongoing, Not Progressing     Problem: Cognitive Impairment (Psychotic Signs/Symptoms)  Goal: Optimal Cognitive Function (Psychotic Signs/Symptoms)  8/9/2023 0046 by Madelyn Lowry RN  Outcome: Ongoing, Not Progressing  8/9/2023 0043 by Madelyn Lowry RN  Outcome: Ongoing, Not Progressing     Problem: Decreased Participation and Engagement (Psychotic Signs/Symptoms)  Goal: Increased Participation and Engagement (Psychotic Signs/Symptoms)  8/9/2023 0046 by Madelyn Lowry RN  Outcome: Ongoing, Not Progressing  8/9/2023 0043 by Madelyn Lowry RN  Outcome: Ongoing, Not Progressing     Problem: Mood Impairment (Psychotic Signs/Symptoms)  Goal: Improved Mood Symptoms (Psychotic  Signs/Symptoms)  8/9/2023 0046 by Madelyn Lowry RN  Outcome: Ongoing, Not Progressing  8/9/2023 0043 by Madelyn Lowry RN  Outcome: Ongoing, Not Progressing     Problem: Psychomotor Impairment (Psychotic Signs/Symptoms)  Goal: Improved Psychomotor Symptoms (Psychotic Signs/Symptoms)  8/9/2023 0046 by Madelyn Lowry RN  Outcome: Ongoing, Not Progressing  8/9/2023 0043 by Madelyn Lowry RN  Outcome: Ongoing, Not Progressing     Problem: Sensory Perception Impairment (Psychotic Signs/Symptoms)  Goal: Decreased Sensory Symptoms (Psychotic Signs/Symptoms)  8/9/2023 0046 by Madelyn Lowry RN  Outcome: Ongoing, Not Progressing  8/9/2023 0043 by Madelyn Lowry RN  Outcome: Ongoing, Not Progressing     Problem: Sleep Disturbance (Psychotic Signs/Symptoms)  Goal: Improved Sleep (Psychotic Signs/Symptoms)  8/9/2023 0046 by Madelyn Lowry RN  Outcome: Ongoing, Not Progressing  8/9/2023 0043 by Madelyn Lowry RN  Outcome: Ongoing, Not Progressing     Problem: Social, Occupational or Functional Impairment (Psychotic Signs/Symptoms)  Goal: Enhanced Social, Occupational or Functional Skills (Psychotic Signs/Symptoms)  8/9/2023 0046 by Madelyn Lowry RN  Outcome: Ongoing, Not Progressing  8/9/2023 0043 by Madelyn Lowry RN  Outcome: Ongoing, Not Progressing
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
## 2025-03-18 ENCOUNTER — HOSPITAL ENCOUNTER (EMERGENCY)
Facility: HOSPITAL | Age: 35
Discharge: PSYCHIATRIC OR SUBSTANCE USE FACILITY | End: 2025-03-18
Attending: EMERGENCY MEDICINE
Payer: MEDICAID

## 2025-03-18 VITALS
TEMPERATURE: 98 F | DIASTOLIC BLOOD PRESSURE: 74 MMHG | SYSTOLIC BLOOD PRESSURE: 138 MMHG | RESPIRATION RATE: 18 BRPM | HEART RATE: 68 BPM | OXYGEN SATURATION: 97 %

## 2025-03-18 DIAGNOSIS — R45.851 DEPRESSION WITH SUICIDAL IDEATION: Primary | ICD-10-CM

## 2025-03-18 DIAGNOSIS — F20.0 CHRONIC PARANOID SCHIZOPHRENIA: ICD-10-CM

## 2025-03-18 DIAGNOSIS — Z98.890 HISTORY OF OPEN REDUCTION AND INTERNAL FIXATION (ORIF) PROCEDURE: ICD-10-CM

## 2025-03-18 DIAGNOSIS — F32.A DEPRESSION WITH SUICIDAL IDEATION: Primary | ICD-10-CM

## 2025-03-18 DIAGNOSIS — Z87.81 HISTORY OF FRACTURE OF LEFT ANKLE: ICD-10-CM

## 2025-03-18 LAB
ALBUMIN SERPL BCP-MCNC: 4.2 G/DL (ref 3.5–5.2)
ALP SERPL-CCNC: 131 U/L (ref 40–150)
ALT SERPL W/O P-5'-P-CCNC: 38 U/L (ref 10–44)
AMPHET+METHAMPHET UR QL: NEGATIVE
ANION GAP SERPL CALC-SCNC: 12 MMOL/L (ref 8–16)
AST SERPL-CCNC: 19 U/L (ref 10–40)
BARBITURATES UR QL SCN>200 NG/ML: NEGATIVE
BASOPHILS # BLD AUTO: 0.05 K/UL (ref 0–0.2)
BASOPHILS NFR BLD: 0.4 % (ref 0–1.9)
BENZODIAZ UR QL SCN>200 NG/ML: NEGATIVE
BILIRUB SERPL-MCNC: 0.5 MG/DL (ref 0.1–1)
BILIRUB UR QL STRIP: NEGATIVE
BUN SERPL-MCNC: 15 MG/DL (ref 6–20)
BZE UR QL SCN: NEGATIVE
CALCIUM SERPL-MCNC: 9.6 MG/DL (ref 8.7–10.5)
CANNABINOIDS UR QL SCN: NEGATIVE
CHLORIDE SERPL-SCNC: 101 MMOL/L (ref 95–110)
CLARITY UR: CLEAR
CO2 SERPL-SCNC: 21 MMOL/L (ref 23–29)
COLOR UR: YELLOW
CREAT SERPL-MCNC: 0.8 MG/DL (ref 0.5–1.4)
CREAT UR-MCNC: 207.4 MG/DL (ref 23–375)
DIFFERENTIAL METHOD BLD: ABNORMAL
EOSINOPHIL # BLD AUTO: 0.1 K/UL (ref 0–0.5)
EOSINOPHIL NFR BLD: 0.5 % (ref 0–8)
ERYTHROCYTE [DISTWIDTH] IN BLOOD BY AUTOMATED COUNT: 12.3 % (ref 11.5–14.5)
EST. GFR  (NO RACE VARIABLE): >60 ML/MIN/1.73 M^2
ETHANOL SERPL-MCNC: <10 MG/DL
GLUCOSE SERPL-MCNC: 123 MG/DL (ref 70–110)
GLUCOSE UR QL STRIP: NEGATIVE
HCT VFR BLD AUTO: 36 % (ref 40–54)
HGB BLD-MCNC: 12.1 G/DL (ref 14–18)
HGB UR QL STRIP: ABNORMAL
IMM GRANULOCYTES # BLD AUTO: 0.06 K/UL (ref 0–0.04)
IMM GRANULOCYTES NFR BLD AUTO: 0.5 % (ref 0–0.5)
KETONES UR QL STRIP: NEGATIVE
LEUKOCYTE ESTERASE UR QL STRIP: NEGATIVE
LYMPHOCYTES # BLD AUTO: 1.7 K/UL (ref 1–4.8)
LYMPHOCYTES NFR BLD: 13.6 % (ref 18–48)
MCH RBC QN AUTO: 30.4 PG (ref 27–31)
MCHC RBC AUTO-ENTMCNC: 33.6 G/DL (ref 32–36)
MCV RBC AUTO: 91 FL (ref 82–98)
METHADONE UR QL SCN>300 NG/ML: NEGATIVE
MICROSCOPIC COMMENT: ABNORMAL
MONOCYTES # BLD AUTO: 0.9 K/UL (ref 0.3–1)
MONOCYTES NFR BLD: 7.1 % (ref 4–15)
NEUTROPHILS # BLD AUTO: 9.5 K/UL (ref 1.8–7.7)
NEUTROPHILS NFR BLD: 77.9 % (ref 38–73)
NITRITE UR QL STRIP: NEGATIVE
NRBC BLD-RTO: 0 /100 WBC
OPIATES UR QL SCN: NEGATIVE
PCP UR QL SCN>25 NG/ML: NEGATIVE
PH UR STRIP: 6 [PH] (ref 5–8)
PLATELET # BLD AUTO: 359 K/UL (ref 150–450)
PMV BLD AUTO: 9.3 FL (ref 9.2–12.9)
POTASSIUM SERPL-SCNC: 3.7 MMOL/L (ref 3.5–5.1)
PROT SERPL-MCNC: 7.2 G/DL (ref 6–8.4)
PROT UR QL STRIP: ABNORMAL
RBC # BLD AUTO: 3.98 M/UL (ref 4.6–6.2)
RBC #/AREA URNS HPF: 83 /HPF (ref 0–4)
SARS-COV-2 RDRP RESP QL NAA+PROBE: NEGATIVE
SODIUM SERPL-SCNC: 134 MMOL/L (ref 136–145)
SP GR UR STRIP: 1.02 (ref 1–1.03)
TOXICOLOGY INFORMATION: NORMAL
URN SPEC COLLECT METH UR: ABNORMAL
UROBILINOGEN UR STRIP-ACNC: ABNORMAL EU/DL
WBC # BLD AUTO: 12.16 K/UL (ref 3.9–12.7)
WBC #/AREA URNS HPF: 4 /HPF (ref 0–5)
WBC CLUMPS URNS QL MICRO: ABNORMAL

## 2025-03-18 PROCEDURE — 80053 COMPREHEN METABOLIC PANEL: CPT | Performed by: EMERGENCY MEDICINE

## 2025-03-18 PROCEDURE — 99285 EMERGENCY DEPT VISIT HI MDM: CPT

## 2025-03-18 PROCEDURE — 80307 DRUG TEST PRSMV CHEM ANLYZR: CPT | Performed by: EMERGENCY MEDICINE

## 2025-03-18 PROCEDURE — 85025 COMPLETE CBC W/AUTO DIFF WBC: CPT | Performed by: EMERGENCY MEDICINE

## 2025-03-18 PROCEDURE — 87635 SARS-COV-2 COVID-19 AMP PRB: CPT | Performed by: EMERGENCY MEDICINE

## 2025-03-18 PROCEDURE — 82077 ASSAY SPEC XCP UR&BREATH IA: CPT | Performed by: EMERGENCY MEDICINE

## 2025-03-18 PROCEDURE — 81000 URINALYSIS NONAUTO W/SCOPE: CPT | Mod: 59 | Performed by: EMERGENCY MEDICINE
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
## 2025-03-19 PROBLEM — R46.2 BIZARRE BEHAVIOR: Status: ACTIVE | Noted: 2025-03-19

## 2025-03-19 PROBLEM — Z13.9 ENCOUNTER FOR MEDICAL SCREENING EXAMINATION: Status: ACTIVE | Noted: 2025-03-19

## 2025-03-19 PROBLEM — E87.1 HYPONATREMIA: Status: ACTIVE | Noted: 2025-03-19

## 2025-03-19 PROBLEM — S82.892D CLOSED FRACTURE OF LEFT ANKLE WITH ROUTINE HEALING: Status: ACTIVE | Noted: 2025-03-19

## 2025-03-19 PROBLEM — D64.9 ANEMIA: Status: ACTIVE | Noted: 2025-03-19

## 2025-04-10 ENCOUNTER — HOSPITAL ENCOUNTER (EMERGENCY)
Facility: HOSPITAL | Age: 35
Discharge: PSYCHIATRIC OR SUBSTANCE USE FACILITY | End: 2025-04-10
Attending: EMERGENCY MEDICINE
Payer: MEDICAID

## 2025-04-10 VITALS
SYSTOLIC BLOOD PRESSURE: 146 MMHG | HEIGHT: 66 IN | TEMPERATURE: 98 F | DIASTOLIC BLOOD PRESSURE: 89 MMHG | RESPIRATION RATE: 16 BRPM | OXYGEN SATURATION: 100 % | HEART RATE: 96 BPM | BODY MASS INDEX: 27.7 KG/M2 | WEIGHT: 172.38 LBS

## 2025-04-10 DIAGNOSIS — F22 PARANOIA: ICD-10-CM

## 2025-04-10 DIAGNOSIS — R44.3 HALLUCINATION: ICD-10-CM

## 2025-04-10 DIAGNOSIS — R45.1 AGITATION: Primary | ICD-10-CM

## 2025-04-10 DIAGNOSIS — F22 DELUSION: ICD-10-CM

## 2025-04-10 LAB
ABSOLUTE EOSINOPHIL (OHS): 0.04 K/UL
ABSOLUTE MONOCYTE (OHS): 0.8 K/UL (ref 0.3–1)
ABSOLUTE NEUTROPHIL COUNT (OHS): 9.45 K/UL (ref 1.8–7.7)
ALBUMIN SERPL BCP-MCNC: 3.7 G/DL (ref 3.5–5.2)
ALP SERPL-CCNC: 103 UNIT/L (ref 40–150)
ALT SERPL W/O P-5'-P-CCNC: 22 UNIT/L (ref 10–44)
AMPHET UR QL SCN: NEGATIVE
ANION GAP (OHS): 10 MMOL/L (ref 8–16)
APAP SERPL-MCNC: <3 UG/ML (ref 10–20)
AST SERPL-CCNC: 22 UNIT/L (ref 11–45)
BACTERIA #/AREA URNS AUTO: ABNORMAL /HPF
BARBITURATE SCN PRESENT UR: NEGATIVE
BASOPHILS # BLD AUTO: 0.05 K/UL
BASOPHILS NFR BLD AUTO: 0.4 %
BENZODIAZ UR QL SCN: NEGATIVE
BILIRUB SERPL-MCNC: 0.5 MG/DL (ref 0.1–1)
BILIRUB UR QL STRIP.AUTO: NEGATIVE
BUN SERPL-MCNC: 11 MG/DL (ref 6–20)
CALCIUM SERPL-MCNC: 8.6 MG/DL (ref 8.7–10.5)
CANNABINOIDS UR QL SCN: NEGATIVE
CHLORIDE SERPL-SCNC: 106 MMOL/L (ref 95–110)
CLARITY UR: CLEAR
CO2 SERPL-SCNC: 23 MMOL/L (ref 23–29)
COCAINE UR QL SCN: NEGATIVE
COLOR UR AUTO: COLORLESS
CREAT SERPL-MCNC: 0.7 MG/DL (ref 0.5–1.4)
CREAT UR-MCNC: 42 MG/DL (ref 23–375)
ERYTHROCYTE [DISTWIDTH] IN BLOOD BY AUTOMATED COUNT: 12.4 % (ref 11.5–14.5)
ETHANOL SERPL-MCNC: <10 MG/DL
GFR SERPLBLD CREATININE-BSD FMLA CKD-EPI: >60 ML/MIN/1.73/M2
GLUCOSE SERPL-MCNC: 103 MG/DL (ref 70–110)
GLUCOSE UR QL STRIP: NEGATIVE
HCT VFR BLD AUTO: 35.9 % (ref 40–54)
HCV AB SERPL QL IA: NORMAL
HGB BLD-MCNC: 12 GM/DL (ref 14–18)
HGB UR QL STRIP: ABNORMAL
IMM GRANULOCYTES # BLD AUTO: 0.06 K/UL (ref 0–0.04)
IMM GRANULOCYTES NFR BLD AUTO: 0.5 % (ref 0–0.5)
KETONES UR QL STRIP: ABNORMAL
LEUKOCYTE ESTERASE UR QL STRIP: NEGATIVE
LYMPHOCYTES # BLD AUTO: 1.5 K/UL (ref 1–4.8)
MCH RBC QN AUTO: 30.6 PG (ref 27–31)
MCHC RBC AUTO-ENTMCNC: 33.4 G/DL (ref 32–36)
MCV RBC AUTO: 92 FL (ref 82–98)
METHADONE UR QL SCN: NEGATIVE
MICROSCOPIC COMMENT: ABNORMAL
NITRITE UR QL STRIP: NEGATIVE
NUCLEATED RBC (/100WBC) (OHS): 0 /100 WBC
OPIATES UR QL SCN: NEGATIVE
PCP UR QL: NEGATIVE
PH UR STRIP: 6 [PH]
PLATELET # BLD AUTO: 274 K/UL (ref 150–450)
PMV BLD AUTO: 9.8 FL (ref 9.2–12.9)
POTASSIUM SERPL-SCNC: 3 MMOL/L (ref 3.5–5.1)
PROT SERPL-MCNC: 6.9 GM/DL (ref 6–8.4)
PROT UR QL STRIP: NEGATIVE
RBC # BLD AUTO: 3.92 M/UL (ref 4.6–6.2)
RBC #/AREA URNS AUTO: 8 /HPF (ref 0–4)
RELATIVE EOSINOPHIL (OHS): 0.3 %
RELATIVE LYMPHOCYTE (OHS): 12.6 % (ref 18–48)
RELATIVE MONOCYTE (OHS): 6.7 % (ref 4–15)
RELATIVE NEUTROPHIL (OHS): 79.5 % (ref 38–73)
SODIUM SERPL-SCNC: 139 MMOL/L (ref 136–145)
SP GR UR STRIP: 1.01
TSH SERPL-ACNC: 1.33 UIU/ML (ref 0.4–4)
UROBILINOGEN UR STRIP-ACNC: NEGATIVE EU/DL
WBC # BLD AUTO: 11.9 K/UL (ref 3.9–12.7)
WBC #/AREA URNS AUTO: 1 /HPF (ref 0–5)

## 2025-04-10 PROCEDURE — 25000003 PHARM REV CODE 250: Performed by: STUDENT IN AN ORGANIZED HEALTH CARE EDUCATION/TRAINING PROGRAM

## 2025-04-10 PROCEDURE — 80053 COMPREHEN METABOLIC PANEL: CPT | Performed by: STUDENT IN AN ORGANIZED HEALTH CARE EDUCATION/TRAINING PROGRAM

## 2025-04-10 PROCEDURE — 80307 DRUG TEST PRSMV CHEM ANLYZR: CPT | Performed by: STUDENT IN AN ORGANIZED HEALTH CARE EDUCATION/TRAINING PROGRAM

## 2025-04-10 PROCEDURE — 82077 ASSAY SPEC XCP UR&BREATH IA: CPT | Performed by: STUDENT IN AN ORGANIZED HEALTH CARE EDUCATION/TRAINING PROGRAM

## 2025-04-10 PROCEDURE — 86803 HEPATITIS C AB TEST: CPT | Performed by: PHYSICIAN ASSISTANT

## 2025-04-10 PROCEDURE — 85025 COMPLETE CBC W/AUTO DIFF WBC: CPT | Performed by: STUDENT IN AN ORGANIZED HEALTH CARE EDUCATION/TRAINING PROGRAM

## 2025-04-10 PROCEDURE — 63600175 PHARM REV CODE 636 W HCPCS: Performed by: STUDENT IN AN ORGANIZED HEALTH CARE EDUCATION/TRAINING PROGRAM

## 2025-04-10 PROCEDURE — 84443 ASSAY THYROID STIM HORMONE: CPT | Performed by: STUDENT IN AN ORGANIZED HEALTH CARE EDUCATION/TRAINING PROGRAM

## 2025-04-10 PROCEDURE — 99285 EMERGENCY DEPT VISIT HI MDM: CPT | Mod: 25

## 2025-04-10 PROCEDURE — 81001 URINALYSIS AUTO W/SCOPE: CPT | Mod: XB | Performed by: STUDENT IN AN ORGANIZED HEALTH CARE EDUCATION/TRAINING PROGRAM

## 2025-04-10 PROCEDURE — 80143 DRUG ASSAY ACETAMINOPHEN: CPT | Performed by: STUDENT IN AN ORGANIZED HEALTH CARE EDUCATION/TRAINING PROGRAM

## 2025-04-10 PROCEDURE — 96374 THER/PROPH/DIAG INJ IV PUSH: CPT

## 2025-04-10 RX ORDER — ONDANSETRON HYDROCHLORIDE 2 MG/ML
4 INJECTION, SOLUTION INTRAVENOUS
Status: COMPLETED | OUTPATIENT
Start: 2025-04-10 | End: 2025-04-10

## 2025-04-10 RX ADMIN — POTASSIUM BICARBONATE 50 MEQ: 978 TABLET, EFFERVESCENT ORAL at 11:04

## 2025-04-10 RX ADMIN — ONDANSETRON 4 MG: 2 INJECTION INTRAMUSCULAR; INTRAVENOUS at 01:04

## 2025-04-10 NOTE — ED PROVIDER NOTES
Encounter Date: 4/10/2025       History     Chief Complaint   Patient presents with    Hallucinations     Hx schizophrenia. Hallucinations. 5mg IV Droperidol given by EMS for agitation.     HPI      34-year-old male with history of bipolar disorder, schizophrenia, hyponatremia, presenting for agitation.      Patient was brought in by EMS.  According to EMS report, patient was internally stimulated and anxious.  He was found sitting on a street corner.  He was actively hallucinating auditory and visual hallucinations, internally stimulated, extremely anxious.  He was endorsing paranoid delusions that he was going to die.  Vital signs are stable for EMS, they gave 5 mg of IV droperidol prior to arrival.  In the emergency department, patient is sleeping, unable to answer questions.  Review of systems limited by altered mental status 2/2 chemical agitation treatment.         Review of patient's allergies indicates:  No Known Allergies  Past Medical History:   Diagnosis Date    Bipolar disorder, unspecified     Fatty liver     Schizophrenia, unspecified      Past Surgical History:   Procedure Laterality Date    FRACTURE SURGERY       Family History   Problem Relation Name Age of Onset    Hypertension Mother      Hypertension Father      No Known Problems Maternal Aunt      Kidney failure Maternal Grandfather      Diabetes Mellitus Paternal Grandmother       Social History[1]  Review of Systems  See HPI for pertinent ROS.   Physical Exam     Initial Vitals [04/10/25 0944]   BP Pulse Resp Temp SpO2   (!) 140/90 102 18 98.1 °F (36.7 °C) 99 %      MAP       --         Physical Exam    Constitutional: He appears well-developed and well-nourished. He appears lethargic.   HENT:   Head: Normocephalic and atraumatic.   Eyes: Pupils are equal, round, and reactive to light. No scleral icterus.   Neck: No JVD present.   Normal range of motion.  Cardiovascular:  Normal rate and regular rhythm.     Exam reveals no gallop and no  friction rub.       No murmur heard.  Pulmonary/Chest: Breath sounds normal. No stridor. He has no wheezes. He has no rhonchi. He has no rales.   Abdominal: Abdomen is soft. There is no abdominal tenderness. There is no rebound and no guarding.   Musculoskeletal:         General: No tenderness or edema.      Cervical back: Normal range of motion.     Neurological: He appears lethargic. GCS eye subscore is 1. GCS verbal subscore is 1. GCS motor subscore is 5.   Skin: Skin is warm. Capillary refill takes less than 2 seconds.   Psychiatric: He has a normal mood and affect.         ED Course   Procedures  Labs Reviewed   URINALYSIS, REFLEX TO URINE CULTURE - Abnormal       Result Value    Color, UA Colorless (*)     Appearance, UA Clear      pH, UA 6.0      Spec Grav UA 1.010      Protein, UA Negative      Glucose, UA Negative      Ketones, UA 1+ (*)     Bilirubin, UA Negative      Blood, UA 3+ (*)     Nitrites, UA Negative      Urobilinogen, UA Negative      Leukocyte Esterase, UA Negative     ACETAMINOPHEN LEVEL - Abnormal    Acetaminophen Level <3.0 (*)    COMPREHENSIVE METABOLIC PANEL - Abnormal    Sodium 139      Potassium 3.0 (*)     Chloride 106      CO2 23      Glucose 103      BUN 11      Creatinine 0.7      Calcium 8.6 (*)     Protein Total 6.9      Albumin 3.7      Bilirubin Total 0.5            AST 22      ALT 22      Anion Gap 10      eGFR >60     CBC WITH DIFFERENTIAL - Abnormal    WBC 11.90      RBC 3.92 (*)     HGB 12.0 (*)     HCT 35.9 (*)     MCV 92      MCH 30.6      MCHC 33.4      RDW 12.4      Platelet Count 274      MPV 9.8      Nucleated RBC 0      Neut % 79.5 (*)     Lymph % 12.6 (*)     Mono % 6.7      Eos % 0.3      Basophil % 0.4      Imm Grans % 0.5      Neut # 9.45 (*)     Lymph # 1.50      Mono # 0.80      Eos # 0.04      Baso # 0.05      Imm Grans # 0.06 (*)    URINALYSIS MICROSCOPIC - Abnormal    RBC, UA 8 (*)     WBC, UA 1      Bacteria, UA Rare      Microscopic Comment      "  HEPATITIS C ANTIBODY - Normal    Hep C Ab Interp Non-Reactive     ALCOHOL,MEDICAL (ETHANOL) - Normal    Alcohol, Serum <10     TSH - Normal    TSH 1.330     DRUG SCREEN PANEL, URINE EMERGENCY - Normal    Benzodiazepine, Urine Negative      Methadone, Urine Negative      Cocaine, Urine Negative      Opiates, Urine Negative      Barbiturates, Urine Negative      Amphetamines, Urine Negative      THC Negative      Phencyclidine, Urine Negative      Urine Creatinine 42.0      Narrative:     This screen includes the following classes of drugs at the listed cut-off:     Benzodiazepines:        200 ng/ml   Methadone:              300 ng/ml   Cocaine metabolite:     300 ng/ml   Opiates:                300 ng/ml   Barbiturates:           200 ng/ml   Amphetamines:           1000 ng/ml   Marijuana metabs (THC): 50 ng/ml   Phencyclidine (PCP):    25 ng/ml     This is a screening test. If results do not correlate with clinical presentation, then a confirmatory send out test is advised.    This report is intended for use in clinical monitoring and management of patients. It is not intended for use in employment related drug testing."   CBC W/ AUTO DIFFERENTIAL    Narrative:     The following orders were created for panel order CBC auto differential.  Procedure                               Abnormality         Status                     ---------                               -----------         ------                     CBC with Differential[[id number]]       Abnormal            Final result                 Please view results for these tests on the individual orders.          Imaging Results    None          Medications   potassium bicarbonate disintegrating tablet 50 mEq (50 mEq Oral Given 4/10/25 1155)   ondansetron injection 4 mg (4 mg Intravenous Given 4/10/25 1330)     Medical Decision Making  Amount and/or Complexity of Data Reviewed  Labs: ordered. Decision-making details documented in ED Course.    Risk  Prescription " drug management.               ED Course as of 04/10/25 1358   u Apr 10, 2025   1357 Blood, UA(!): 3+ [KB]   1357 RBC, UA(!): 8 [KB]      ED Course User Index  [KB] Jackie Stone MD                           34-year-old male described as above presenting for acute decompensated schizophrenia, agitation, hallucinations.  On arrival, vital signs are stable, he is afebrile and saturating well on room air.  He was previously treated with IV droperidol, 5 mg.  Secondary to his chemical agitation treatment, patient is now sleeping and unable to answer questions.  Pec order signed, plan for inpatient psychiatric treatment.  No UTI, no polysubstance abuse on drug panel, no leukocytosis, no acute anemia requiring transfusion, mild hypokalemia, was given oral potassium replacement was Zofran p.r.n. for nausea.  No MAGGIE, no significant electrolyte abnormalities, no evidence of hypo/hyperthyroidism.  Following workup as above, patient is medically clear for further inpatient psychiatric treatment      Clinical Impression:  Final diagnoses:  [R45.1] Agitation (Primary)  [R44.3] Hallucination  [F22] Delusion  [F22] Paranoia                     [1]   Social History  Tobacco Use    Smoking status: Former     Current packs/day: 0.50     Average packs/day: 0.5 packs/day for 7.0 years (3.5 ttl pk-yrs)     Types: Cigarettes    Smokeless tobacco: Former    Tobacco comments:     Vaped for 6 months. Last time was 2 months ago.   Substance Use Topics    Alcohol use: Yes     Comment: one beer per month    Drug use: Not Currently     Comment: I am clean for three years        Jackie Stone MD  Resident  04/10/25 1593

## 2025-04-10 NOTE — ED TRIAGE NOTES
"Hardy Barnett, a 34 y.o. male presents to the ED w/ complaint of hallucinations. Pt states "I am having thoughts that I ended the world." Pt states that these hallucinations have been going on for 3 days. +visual hallucinations. Pt states that he has SI currently, and has had attempts in the recent past. Last suicide attempt pt states was March 6, 2025. Pt denies HI, CP, SOB, N/V/D. Endorses feeling "exhausted" from the 5mg IV droperidol received en route to the ED.    Triage note:  Chief Complaint   Patient presents with    Hallucinations     Hx schizophrenia. Hallucinations. 5mg IV Droperidol given by EMS for agitation.     Review of patient's allergies indicates:  No Known Allergies  Past Medical History:   Diagnosis Date    Bipolar disorder, unspecified     Fatty liver     Schizophrenia, unspecified        "

## 2025-04-10 NOTE — ED NOTES
Patient resting in stretcher and is in NAD at this time. Remains in paper scrubs at this time. Pt is awake alert and oriented x4, respirations even and unlabored. Pt denies pain at this time. Pt calm. Environment safe. Risk sitter at bedside for one to one observation and q15min safety checks. Pt aware of POC. Bed low and locked with side rails up x2.

## 2025-04-10 NOTE — ED NOTES
"Pt dry heaving, provided with emesis bag and asked if he feels nauseas. Pt states "I do not feel nauseas and I don't need to throw up." Denies wanting medication for nausea.  "